# Patient Record
Sex: FEMALE | Race: WHITE | NOT HISPANIC OR LATINO | URBAN - METROPOLITAN AREA
[De-identification: names, ages, dates, MRNs, and addresses within clinical notes are randomized per-mention and may not be internally consistent; named-entity substitution may affect disease eponyms.]

---

## 2017-06-27 ENCOUNTER — HOSPITAL ENCOUNTER (OUTPATIENT)
Dept: LAB | Facility: MEDICAL CENTER | Age: 72
End: 2017-06-27
Attending: FAMILY MEDICINE
Payer: MEDICARE

## 2017-06-27 LAB
ALBUMIN SERPL BCP-MCNC: 4.7 G/DL (ref 3.2–4.9)
ALBUMIN/GLOB SERPL: 1.4 G/DL
ALP SERPL-CCNC: 45 U/L (ref 30–99)
ALT SERPL-CCNC: 43 U/L (ref 2–50)
ANION GAP SERPL CALC-SCNC: 10 MMOL/L (ref 0–11.9)
AST SERPL-CCNC: 32 U/L (ref 12–45)
BILIRUB SERPL-MCNC: 0.5 MG/DL (ref 0.1–1.5)
BUN SERPL-MCNC: 15 MG/DL (ref 8–22)
CALCIUM SERPL-MCNC: 10.7 MG/DL (ref 8.5–10.5)
CHLORIDE SERPL-SCNC: 106 MMOL/L (ref 96–112)
CHOLEST SERPL-MCNC: 169 MG/DL (ref 100–199)
CO2 SERPL-SCNC: 25 MMOL/L (ref 20–33)
CREAT SERPL-MCNC: 0.9 MG/DL (ref 0.5–1.4)
EST. AVERAGE GLUCOSE BLD GHB EST-MCNC: 111 MG/DL
GFR SERPL CREATININE-BSD FRML MDRD: >60 ML/MIN/1.73 M 2
GLOBULIN SER CALC-MCNC: 3.4 G/DL (ref 1.9–3.5)
GLUCOSE SERPL-MCNC: 81 MG/DL (ref 65–99)
HBA1C MFR BLD: 5.5 % (ref 0–5.6)
HDLC SERPL-MCNC: 45 MG/DL
LDLC SERPL CALC-MCNC: 67 MG/DL
POTASSIUM SERPL-SCNC: 3.9 MMOL/L (ref 3.6–5.5)
PROT SERPL-MCNC: 8.1 G/DL (ref 6–8.2)
SODIUM SERPL-SCNC: 141 MMOL/L (ref 135–145)
TRIGL SERPL-MCNC: 285 MG/DL (ref 0–149)

## 2017-06-27 PROCEDURE — 80053 COMPREHEN METABOLIC PANEL: CPT

## 2017-06-27 PROCEDURE — 36415 COLL VENOUS BLD VENIPUNCTURE: CPT

## 2017-06-27 PROCEDURE — 80061 LIPID PANEL: CPT

## 2017-06-27 PROCEDURE — 83036 HEMOGLOBIN GLYCOSYLATED A1C: CPT

## 2017-07-31 ENCOUNTER — HOSPITAL ENCOUNTER (OUTPATIENT)
Dept: LAB | Facility: MEDICAL CENTER | Age: 72
End: 2017-07-31
Attending: FAMILY MEDICINE
Payer: MEDICARE

## 2017-07-31 LAB
CALCIUM SERPL-MCNC: 10 MG/DL (ref 8.5–10.5)
PTH-INTACT SERPL-MCNC: 31.9 PG/ML (ref 14–72)

## 2017-07-31 PROCEDURE — 83970 ASSAY OF PARATHORMONE: CPT

## 2017-07-31 PROCEDURE — 82310 ASSAY OF CALCIUM: CPT

## 2017-07-31 PROCEDURE — 36415 COLL VENOUS BLD VENIPUNCTURE: CPT

## 2017-11-14 ENCOUNTER — HOSPITAL ENCOUNTER (OUTPATIENT)
Dept: LAB | Facility: MEDICAL CENTER | Age: 72
End: 2017-11-14
Attending: FAMILY MEDICINE
Payer: MEDICARE

## 2017-11-14 LAB
ALBUMIN SERPL BCP-MCNC: 4.1 G/DL (ref 3.2–4.9)
ALBUMIN/GLOB SERPL: 1.3 G/DL
ALP SERPL-CCNC: 48 U/L (ref 30–99)
ALT SERPL-CCNC: 37 U/L (ref 2–50)
ANION GAP SERPL CALC-SCNC: 7 MMOL/L (ref 0–11.9)
AST SERPL-CCNC: 29 U/L (ref 12–45)
BILIRUB SERPL-MCNC: 0.4 MG/DL (ref 0.1–1.5)
BUN SERPL-MCNC: 18 MG/DL (ref 8–22)
CALCIUM SERPL-MCNC: 9.7 MG/DL (ref 8.5–10.5)
CHLORIDE SERPL-SCNC: 110 MMOL/L (ref 96–112)
CHOLEST SERPL-MCNC: 148 MG/DL (ref 100–199)
CO2 SERPL-SCNC: 22 MMOL/L (ref 20–33)
CREAT SERPL-MCNC: 0.87 MG/DL (ref 0.5–1.4)
EST. AVERAGE GLUCOSE BLD GHB EST-MCNC: 123 MG/DL
GFR SERPL CREATININE-BSD FRML MDRD: >60 ML/MIN/1.73 M 2
GLOBULIN SER CALC-MCNC: 3.2 G/DL (ref 1.9–3.5)
GLUCOSE SERPL-MCNC: 96 MG/DL (ref 65–99)
HBA1C MFR BLD: 5.9 % (ref 0–5.6)
HDLC SERPL-MCNC: 43 MG/DL
LDLC SERPL CALC-MCNC: 57 MG/DL
POTASSIUM SERPL-SCNC: 4 MMOL/L (ref 3.6–5.5)
PROT SERPL-MCNC: 7.3 G/DL (ref 6–8.2)
SODIUM SERPL-SCNC: 139 MMOL/L (ref 135–145)
TRIGL SERPL-MCNC: 238 MG/DL (ref 0–149)

## 2017-11-14 PROCEDURE — 80061 LIPID PANEL: CPT

## 2017-11-14 PROCEDURE — 36415 COLL VENOUS BLD VENIPUNCTURE: CPT

## 2017-11-14 PROCEDURE — 80053 COMPREHEN METABOLIC PANEL: CPT

## 2017-11-14 PROCEDURE — 83036 HEMOGLOBIN GLYCOSYLATED A1C: CPT

## 2017-11-29 ENCOUNTER — PATIENT OUTREACH (OUTPATIENT)
Dept: HEALTH INFORMATION MANAGEMENT | Facility: OTHER | Age: 72
End: 2017-11-29

## 2017-11-29 NOTE — PROGRESS NOTES
Attempt #:1    WebIZ Checked & Epic Updated: No WebIZ record  Verify PCP: yes    Communication Preference Obtained: yes     Review Care Team: yes       Care Gap Scheduling (Attempt to Schedule EACH Overdue Care Gap!)     Health Maintenance Due   Topic Date Due   • Annual Wellness Visit  1945   • IMM DTaP/Tdap/Td Vaccine (1 - Tdap) 11/01/1962   • PAP SMEAR  11/01/1964   • COLONOSCOPY  11/01/1993   • IMM ZOSTER VACCINE  11/01/2003   • BONE DENSITY  11/01/2008   • IMM PNEUMOCOCCAL 65+ (ADULT) LOW/MEDIUM RISK SERIES (1 of 2 - PCV13) 11/01/2008   • MAMMOGRAM  02/06/2014   • IMM INFLUENZA (1) 09/01/2017        Scheduled patient for Mammogram       CourseNetworking Activation: DECLINED  CourseNetworking Beena: no  Virtual Visits: no  Opt In to Text Messages: no

## 2017-12-05 ENCOUNTER — HOSPITAL ENCOUNTER (OUTPATIENT)
Dept: RADIOLOGY | Facility: MEDICAL CENTER | Age: 72
End: 2017-12-05
Attending: FAMILY MEDICINE
Payer: MEDICARE

## 2017-12-05 DIAGNOSIS — Z12.31 SCREENING MAMMOGRAM, ENCOUNTER FOR: ICD-10-CM

## 2017-12-05 PROCEDURE — G0202 SCR MAMMO BI INCL CAD: HCPCS

## 2018-01-10 ENCOUNTER — HOSPITAL ENCOUNTER (OUTPATIENT)
Dept: RADIOLOGY | Facility: MEDICAL CENTER | Age: 73
End: 2018-01-10
Attending: FAMILY MEDICINE
Payer: MEDICARE

## 2018-01-10 DIAGNOSIS — M85.80 SENILE OSTEOPENIA: ICD-10-CM

## 2018-01-10 PROCEDURE — 77080 DXA BONE DENSITY AXIAL: CPT

## 2021-01-14 DIAGNOSIS — Z23 NEED FOR VACCINATION: ICD-10-CM

## 2022-02-16 PROBLEM — G62.9 NEUROPATHY: Status: ACTIVE | Noted: 2022-02-16

## 2022-02-16 PROBLEM — L82.1 SEBORRHEIC KERATOSES: Status: ACTIVE | Noted: 2022-02-16

## 2022-02-16 PROBLEM — N39.46 MIXED STRESS AND URGE URINARY INCONTINENCE: Status: ACTIVE | Noted: 2022-02-16

## 2022-02-16 PROBLEM — M25.551 RIGHT HIP PAIN: Status: ACTIVE | Noted: 2022-02-16

## 2022-02-16 PROBLEM — M54.50 CHRONIC RIGHT-SIDED LOW BACK PAIN WITHOUT SCIATICA: Status: ACTIVE | Noted: 2022-02-16

## 2022-02-16 PROBLEM — I10 PRIMARY HYPERTENSION: Status: ACTIVE | Noted: 2022-02-16

## 2022-02-16 PROBLEM — G89.29 CHRONIC PAIN OF RIGHT KNEE: Status: ACTIVE | Noted: 2022-02-16

## 2022-02-16 PROBLEM — G89.29 CHRONIC RIGHT-SIDED LOW BACK PAIN WITHOUT SCIATICA: Status: ACTIVE | Noted: 2022-02-16

## 2022-02-16 PROBLEM — M25.561 CHRONIC PAIN OF RIGHT KNEE: Status: ACTIVE | Noted: 2022-02-16

## 2022-02-16 PROBLEM — R10.84 GENERALIZED ABDOMINAL PAIN: Status: ACTIVE | Noted: 2022-02-16

## 2022-02-16 PROBLEM — E78.2 MIXED HYPERLIPIDEMIA: Status: ACTIVE | Noted: 2022-02-16

## 2022-02-16 PROBLEM — R74.01 ELEVATED ALANINE AMINOTRANSFERASE (ALT) LEVEL: Status: ACTIVE | Noted: 2022-02-16

## 2022-02-16 PROBLEM — M15.9 PRIMARY OSTEOARTHRITIS INVOLVING MULTIPLE JOINTS: Status: ACTIVE | Noted: 2022-02-16

## 2022-03-16 PROBLEM — L98.9 SKIN LESION OF NECK: Status: ACTIVE | Noted: 2022-03-16

## 2022-03-16 PROBLEM — R53.82 CHRONIC FATIGUE: Status: ACTIVE | Noted: 2022-03-16

## 2022-03-16 PROBLEM — E66.9 OBESITY (BMI 30-39.9): Status: ACTIVE | Noted: 2022-03-16

## 2022-03-16 PROBLEM — R10.9 ABDOMINAL CRAMPS: Status: ACTIVE | Noted: 2022-03-16

## 2022-05-17 PROBLEM — M25.561 CHRONIC PAIN OF RIGHT KNEE: Chronic | Status: ACTIVE | Noted: 2022-02-16

## 2022-05-17 PROBLEM — R53.82 CHRONIC FATIGUE: Chronic | Status: ACTIVE | Noted: 2022-03-16

## 2022-05-17 PROBLEM — G89.29 CHRONIC PAIN OF RIGHT KNEE: Chronic | Status: ACTIVE | Noted: 2022-02-16

## 2022-05-17 PROBLEM — L98.9 SKIN LESION OF NECK: Chronic | Status: ACTIVE | Noted: 2022-03-16

## 2022-07-13 ENCOUNTER — PREP FOR PROCEDURE (OUTPATIENT)
Dept: SURGERY | Facility: MEDICAL CENTER | Age: 77
End: 2022-07-13

## 2022-07-13 DIAGNOSIS — Z01.818 PRE-OP TESTING: ICD-10-CM

## 2022-07-13 PROBLEM — M17.11 OSTEOARTHRITIS OF RIGHT KNEE: Status: ACTIVE | Noted: 2022-07-13

## 2022-07-13 RX ORDER — CEFAZOLIN SODIUM 1 G/3ML
2 INJECTION, POWDER, FOR SOLUTION INTRAMUSCULAR; INTRAVENOUS ONCE
Status: CANCELLED | OUTPATIENT
Start: 2022-07-13 | End: 2022-07-13

## 2023-02-15 PROBLEM — M25.561 CHRONIC PAIN OF RIGHT KNEE: Chronic | Status: RESOLVED | Noted: 2022-02-16 | Resolved: 2023-02-15

## 2023-02-15 PROBLEM — E78.5 HYPERLIPIDEMIA: Status: ACTIVE | Noted: 2022-02-16

## 2023-02-15 PROBLEM — L98.9 SKIN LESION OF LEFT LEG: Status: ACTIVE | Noted: 2023-02-15

## 2023-02-15 PROBLEM — M19.90 OSTEOARTHRITIS: Status: ACTIVE | Noted: 2023-02-15

## 2023-02-15 PROBLEM — G89.29 CHRONIC PAIN OF RIGHT KNEE: Chronic | Status: RESOLVED | Noted: 2022-02-16 | Resolved: 2023-02-15

## 2023-02-17 PROBLEM — I73.9 PERIPHERAL ARTERIAL DISEASE (HCC): Status: ACTIVE | Noted: 2023-02-17

## 2023-03-07 PROBLEM — J01.40 ACUTE NON-RECURRENT PANSINUSITIS: Status: ACTIVE | Noted: 2023-03-07

## 2023-07-03 PROBLEM — I73.9 PERIPHERAL ARTERIAL DISEASE (HCC): Chronic | Status: ACTIVE | Noted: 2023-02-17

## 2023-07-03 PROBLEM — M17.11 OSTEOARTHRITIS OF RIGHT KNEE: Chronic | Status: ACTIVE | Noted: 2022-07-13

## 2023-07-03 PROBLEM — R51.9 ACUTE NONINTRACTABLE HEADACHE: Chronic | Status: ACTIVE | Noted: 2023-07-03

## 2023-07-03 PROBLEM — R51.9 ACUTE NONINTRACTABLE HEADACHE: Status: ACTIVE | Noted: 2023-07-03

## 2023-07-03 PROBLEM — I10 HYPERTENSION: Chronic | Status: ACTIVE | Noted: 2022-02-16

## 2023-07-03 PROBLEM — L71.9 ROSACEA: Status: ACTIVE | Noted: 2023-07-03

## 2023-07-03 PROBLEM — L98.9 SKIN LESION OF LEFT LEG: Status: RESOLVED | Noted: 2023-02-15 | Resolved: 2023-07-03

## 2023-07-03 PROBLEM — D75.1 POLYCYTHEMIA: Status: ACTIVE | Noted: 2023-07-03

## 2023-09-30 PROBLEM — R30.0 DYSURIA: Status: ACTIVE | Noted: 2023-09-30

## 2023-09-30 PROBLEM — M17.11 OSTEOARTHRITIS OF RIGHT KNEE: Chronic | Status: RESOLVED | Noted: 2022-07-13 | Resolved: 2023-09-30

## 2023-09-30 PROBLEM — J01.40 ACUTE NON-RECURRENT PANSINUSITIS: Status: RESOLVED | Noted: 2023-03-07 | Resolved: 2023-09-30

## 2023-09-30 PROBLEM — R42 DIZZINESS: Status: ACTIVE | Noted: 2023-09-30

## 2023-09-30 PROBLEM — R42 DIZZINESS: Chronic | Status: ACTIVE | Noted: 2023-09-30

## 2023-09-30 PROBLEM — R10.84 GENERALIZED ABDOMINAL PAIN: Chronic | Status: ACTIVE | Noted: 2022-02-16

## 2023-09-30 PROBLEM — R51.9 ACUTE NONINTRACTABLE HEADACHE: Chronic | Status: RESOLVED | Noted: 2023-07-03 | Resolved: 2023-09-30

## 2023-09-30 PROBLEM — R30.0 DYSURIA: Chronic | Status: ACTIVE | Noted: 2023-09-30

## 2024-01-03 PROBLEM — N39.46 MIXED STRESS AND URGE URINARY INCONTINENCE: Chronic | Status: ACTIVE | Noted: 2022-02-16

## 2024-01-03 PROBLEM — Z12.31 SCREENING MAMMOGRAM FOR BREAST CANCER: Status: ACTIVE | Noted: 2024-01-03

## 2024-01-03 PROBLEM — R30.0 DYSURIA: Chronic | Status: RESOLVED | Noted: 2023-09-30 | Resolved: 2024-01-03

## 2024-01-03 PROBLEM — H10.12 ALLERGIC CONJUNCTIVITIS OF LEFT EYE: Chronic | Status: ACTIVE | Noted: 2024-01-03

## 2024-01-03 PROBLEM — H10.12 ALLERGIC CONJUNCTIVITIS OF LEFT EYE: Status: ACTIVE | Noted: 2024-01-03

## 2024-03-14 PROBLEM — H91.93 BILATERAL HEARING LOSS: Status: ACTIVE | Noted: 2024-03-14

## 2024-03-14 PROBLEM — M85.80 OSTEOPENIA: Status: ACTIVE | Noted: 2024-03-14

## 2024-03-14 PROBLEM — R41.3 MEMORY LOSS: Status: ACTIVE | Noted: 2024-03-14

## 2024-04-08 PROBLEM — H10.12 ALLERGIC CONJUNCTIVITIS OF LEFT EYE: Chronic | Status: RESOLVED | Noted: 2024-01-03 | Resolved: 2024-04-08

## 2024-04-08 PROBLEM — K04.7 DENTAL ABSCESS: Chronic | Status: ACTIVE | Noted: 2024-04-08

## 2024-04-08 PROBLEM — E66.09 CLASS 1 OBESITY DUE TO EXCESS CALORIES WITH SERIOUS COMORBIDITY AND BODY MASS INDEX (BMI) OF 31.0 TO 31.9 IN ADULT: Status: ACTIVE | Noted: 2024-04-08

## 2024-04-08 PROBLEM — Z71.85 VACCINE COUNSELING: Status: ACTIVE | Noted: 2024-04-08

## 2024-04-08 PROBLEM — L98.9 SKIN LESION OF NECK: Chronic | Status: RESOLVED | Noted: 2022-03-16 | Resolved: 2024-04-08

## 2024-04-08 PROBLEM — K04.7 DENTAL ABSCESS: Status: ACTIVE | Noted: 2024-04-08

## 2024-04-08 PROBLEM — R53.82 CHRONIC FATIGUE: Chronic | Status: RESOLVED | Noted: 2022-03-16 | Resolved: 2024-04-08
